# Patient Record
Sex: FEMALE | Race: BLACK OR AFRICAN AMERICAN | Employment: UNEMPLOYED | URBAN - METROPOLITAN AREA
[De-identification: names, ages, dates, MRNs, and addresses within clinical notes are randomized per-mention and may not be internally consistent; named-entity substitution may affect disease eponyms.]

---

## 2022-10-04 NOTE — H&P (VIEW-ONLY)
CC:   Chief Complaint   Patient presents with    Advice Only     Went to er for right upper quadrant pain radiating to middle shoulders nausea and vomiting        Assessment:    ICD-10-CM ICD-9-CM    1. Gallstones  K80.20 574.20 SCHEDULE SURGERY      2. Morbid obesity with BMI of 40.0-44.9, adult ([facility]  E66.01 278.01     Z68.41 V85.41           Plan: I was able to review her records from the most recent emergency room visit confirming gallstone noted in the neck of the gallbladder. She was also found to have elevated LFTs. Today she has some mild discomfort in the right upper quadrant but able to tolerate a diet with no changes to bowel or bladder habits. I did recommend she undergo a laparoscopic cholecystectomy sooner rather than later for ongoing symptoms. She agrees with this plan. The risks and benefits of the procedure were reviewed with the patient including infection, bleeding, need for repeat procedure, injury to surrounding structures, chronic pain, bile leak and bile duct injury. The patient's questions were answered and consent was obtained. HPI:  Claudine Patel is a 24 y.o. female who has been experiencing on and off symptoms of right upper quadrant pain since May 2022. She states symptoms will spontaneously improve on their own. No fevers or chills. She states the abdomen currently feels sore. She was recently seen at [facility] on September 29, 2022 presenting for right-sided abdominal pain that was constant. She states she was given pain medication and nausea medication and symptoms did improve. This time she did undergo ultrasound of the right upper quadrant demonstrating a nonmobile stone in the neck of the gallbladder. Reports no changes to bowel or bladder habits. Pain will occasionally radiate into the back between the shoulder blades. She reports no changes to her weight or new medications. Allergies:   Allergies   Allergen Reactions    Lactose Diarrhea Medication Review:  No current outpatient medications on file prior to visit. No current facility-administered medications on file prior to visit. Systems Review:  Review of Systems   Constitutional:  Negative for fatigue, fever and unexpected weight change. Respiratory:  Negative for chest tightness and shortness of breath. Cardiovascular:  Negative for chest pain and palpitations. Gastrointestinal:  Positive for abdominal pain and nausea. Negative for anal bleeding, blood in stool, constipation, rectal pain and vomiting. Skin:  Negative for pallor, rash and wound. PMH:  Past Medical History:   Diagnosis Date    ADD (attention deficit disorder)     Aggressive outburst     Anxiety state, unspecified 04/30/2013    Asthma     Depression     bipolar    Homicide attempt     [facility])     Self mutilation     Suicidal thoughts     Trauma     Unspecified episodic mood disorder 04/30/2013       Surgical History:  Past Surgical History:   Procedure Laterality Date    HX HEENT      tonsillectomy    HX OTHER SURGICAL      growth removed from mouth age 3       Social History:  Social History     Socioeconomic History    Marital status: SINGLE   Tobacco Use    Smoking status: Never    Smokeless tobacco: Never   Substance and Sexual Activity    Alcohol use: No    Drug use: No    Sexual activity: Never       Family History:  Family History   Problem Relation Age of Onset    Schizophrenia Other        No visits with results within 3 Month(s) from this visit. Latest known visit with results is:   Admission on 03/11/2014, Discharged on 03/11/2014   Component Date Value Ref Range Status    HCG urine, QL 03/11/2014 NEGATIVE  NEGATIVE Final    Comment: Test results should be confirmed using serum quantitative hCG when detection                            of pregnancy is critical and before performing any critical medical procedure.     BENZODIAZEPINES 03/11/2014 NEGATIVE  NEGATIVE Final    BARBITURATES 03/11/2014 NEGATIVE  NEGATIVE Final    THC (TH-CANNABINOL) 03/11/2014 NEGATIVE  NEGATIVE Final    OPIATES 03/11/2014 NEGATIVE  NEGATIVE Final    PCP(PHENCYCLIDINE) 03/11/2014 NEGATIVE  NEGATIVE Final    COCAINE 03/11/2014 NEGATIVE  NEGATIVE Final    AMPHETAMINES 03/11/2014 NEGATIVE  NEGATIVE Final    METHADONE 03/11/2014 NEGATIVE  NEGATIVE Final    HDSCOM 03/11/2014 (NOTE)   Final    Comment: Specimen analysis was performed without chain of custody handling. These results should be used for medical purposes only and not for                            legal or employment purposes. Unconfirmed screening results must not                            be used for non-medical purposes. The cut-off concentration for positive results are as follows:                           AMPH     1000 ng/mL                           NIKOLAY      200 ng/mL                           TEE      200 ng/mL                           YELENA       300 ng/mL                           METH      300 ng/mL                           OPI       300 ng/mL                           PCP        25 ng/mL                           THC        50 ng/mL       No image results found. Physical Exam:  Visit Vitals  /70   Pulse 74   Temp 98 °F (36.7 °C)   Resp 18   Ht 5' 6\" (1.676 m)   Wt 122.5 kg (270 lb)   SpO2 95%   BMI 43.58 kg/m²    BMI: Body mass index is 43.58 kg/m². Physical Exam  Constitutional:       Appearance: Normal appearance. She is obese. Eyes:      Extraocular Movements: Extraocular movements intact. Conjunctiva/sclera: Conjunctivae normal.      Pupils: Pupils are equal, round, and reactive to light. Cardiovascular:      Rate and Rhythm: Normal rate. Pulmonary:      Effort: Pulmonary effort is normal.   Abdominal:      General: Abdomen is flat. There is no distension. Palpations: Abdomen is soft. There is no mass. Tenderness: There is no abdominal tenderness.  There is no guarding or rebound. Hernia: No hernia is present. Skin:     General: Skin is warm and dry. Neurological:      General: No focal deficit present. Mental Status: She is alert and oriented to person, place, and time. Psychiatric:         Mood and Affect: Mood normal.         Behavior: Behavior normal.         Thought Content: Thought content normal.         Judgment: Judgment normal.       I have reviewed the information entered by the clinical staff and/or patient and verified it as accurate or edited where necessary.      Electronically signed by:    Luisa Aponte DO, MPH

## 2022-10-04 NOTE — PROGRESS NOTES
CC:   Chief Complaint   Patient presents with    Advice Only     Went to er for right upper quadrant pain radiating to middle shoulders nausea and vomiting        Assessment:    ICD-10-CM ICD-9-CM    1. Gallstones  K80.20 574.20 SCHEDULE SURGERY      2. Morbid obesity with BMI of 40.0-44.9, adult ([facility]  E66.01 278.01     Z68.41 V85.41           Plan: I was able to review her records from the most recent emergency room visit confirming gallstone noted in the neck of the gallbladder. She was also found to have elevated LFTs. Today she has some mild discomfort in the right upper quadrant but able to tolerate a diet with no changes to bowel or bladder habits. I did recommend she undergo a laparoscopic cholecystectomy sooner rather than later for ongoing symptoms. She agrees with this plan. The risks and benefits of the procedure were reviewed with the patient including infection, bleeding, need for repeat procedure, injury to surrounding structures, chronic pain, bile leak and bile duct injury. The patient's questions were answered and consent was obtained. HPI:  Dali Rutledge is a 24 y.o. female who has been experiencing on and off symptoms of right upper quadrant pain since May 2022. She states symptoms will spontaneously improve on their own. No fevers or chills. She states the abdomen currently feels sore. She was recently seen at [facility] on September 29, 2022 presenting for right-sided abdominal pain that was constant. She states she was given pain medication and nausea medication and symptoms did improve. This time she did undergo ultrasound of the right upper quadrant demonstrating a nonmobile stone in the neck of the gallbladder. Reports no changes to bowel or bladder habits. Pain will occasionally radiate into the back between the shoulder blades. She reports no changes to her weight or new medications. Allergies:   Allergies   Allergen Reactions    Lactose Diarrhea Medication Review:  No current outpatient medications on file prior to visit. No current facility-administered medications on file prior to visit. Systems Review:  Review of Systems   Constitutional:  Negative for fatigue, fever and unexpected weight change. Respiratory:  Negative for chest tightness and shortness of breath. Cardiovascular:  Negative for chest pain and palpitations. Gastrointestinal:  Positive for abdominal pain and nausea. Negative for anal bleeding, blood in stool, constipation, rectal pain and vomiting. Skin:  Negative for pallor, rash and wound. PMH:  Past Medical History:   Diagnosis Date    ADD (attention deficit disorder)     Aggressive outburst     Anxiety state, unspecified 04/30/2013    Asthma     Depression     bipolar    Homicide attempt     [facility])     Self mutilation     Suicidal thoughts     Trauma     Unspecified episodic mood disorder 04/30/2013       Surgical History:  Past Surgical History:   Procedure Laterality Date    HX HEENT      tonsillectomy    HX OTHER SURGICAL      growth removed from mouth age 3       Social History:  Social History     Socioeconomic History    Marital status: SINGLE   Tobacco Use    Smoking status: Never    Smokeless tobacco: Never   Substance and Sexual Activity    Alcohol use: No    Drug use: No    Sexual activity: Never       Family History:  Family History   Problem Relation Age of Onset    Schizophrenia Other        No visits with results within 3 Month(s) from this visit. Latest known visit with results is:   Admission on 03/11/2014, Discharged on 03/11/2014   Component Date Value Ref Range Status    HCG urine, QL 03/11/2014 NEGATIVE  NEGATIVE Final    Comment: Test results should be confirmed using serum quantitative hCG when detection                            of pregnancy is critical and before performing any critical medical procedure.     BENZODIAZEPINES 03/11/2014 NEGATIVE  NEGATIVE Final    BARBITURATES 03/11/2014 NEGATIVE  NEGATIVE Final    THC (TH-CANNABINOL) 03/11/2014 NEGATIVE  NEGATIVE Final    OPIATES 03/11/2014 NEGATIVE  NEGATIVE Final    PCP(PHENCYCLIDINE) 03/11/2014 NEGATIVE  NEGATIVE Final    COCAINE 03/11/2014 NEGATIVE  NEGATIVE Final    AMPHETAMINES 03/11/2014 NEGATIVE  NEGATIVE Final    METHADONE 03/11/2014 NEGATIVE  NEGATIVE Final    HDSCOM 03/11/2014 (NOTE)   Final    Comment: Specimen analysis was performed without chain of custody handling. These results should be used for medical purposes only and not for                            legal or employment purposes. Unconfirmed screening results must not                            be used for non-medical purposes. The cut-off concentration for positive results are as follows:                           AMPH     1000 ng/mL                           NIKOLAY      200 ng/mL                           TEE      200 ng/mL                           YELENA       300 ng/mL                           METH      300 ng/mL                           OPI       300 ng/mL                           PCP        25 ng/mL                           THC        50 ng/mL       No image results found. Physical Exam:  Visit Vitals  /70   Pulse 74   Temp 98 °F (36.7 °C)   Resp 18   Ht 5' 6\" (1.676 m)   Wt 122.5 kg (270 lb)   SpO2 95%   BMI 43.58 kg/m²    BMI: Body mass index is 43.58 kg/m². Physical Exam  Constitutional:       Appearance: Normal appearance. She is obese. Eyes:      Extraocular Movements: Extraocular movements intact. Conjunctiva/sclera: Conjunctivae normal.      Pupils: Pupils are equal, round, and reactive to light. Cardiovascular:      Rate and Rhythm: Normal rate. Pulmonary:      Effort: Pulmonary effort is normal.   Abdominal:      General: Abdomen is flat. There is no distension. Palpations: Abdomen is soft. There is no mass. Tenderness: There is no abdominal tenderness.  There is no guarding or rebound. Hernia: No hernia is present. Skin:     General: Skin is warm and dry. Neurological:      General: No focal deficit present. Mental Status: She is alert and oriented to person, place, and time. Psychiatric:         Mood and Affect: Mood normal.         Behavior: Behavior normal.         Thought Content: Thought content normal.         Judgment: Judgment normal.       I have reviewed the information entered by the clinical staff and/or patient and verified it as accurate or edited where necessary.      Electronically signed by:    Carole Harley DO, MPH

## 2022-10-05 ENCOUNTER — OFFICE VISIT (OUTPATIENT)
Dept: SURGERY | Age: 22
End: 2022-10-05
Payer: COMMERCIAL

## 2022-10-05 VITALS
RESPIRATION RATE: 18 BRPM | OXYGEN SATURATION: 95 % | BODY MASS INDEX: 43.39 KG/M2 | DIASTOLIC BLOOD PRESSURE: 70 MMHG | HEIGHT: 66 IN | WEIGHT: 270 LBS | SYSTOLIC BLOOD PRESSURE: 115 MMHG | HEART RATE: 74 BPM | TEMPERATURE: 98 F

## 2022-10-05 DIAGNOSIS — E66.01 MORBID OBESITY WITH BMI OF 40.0-44.9, ADULT (HCC): ICD-10-CM

## 2022-10-05 DIAGNOSIS — K80.20 GALLSTONES: Primary | ICD-10-CM

## 2022-10-05 PROCEDURE — 99204 OFFICE O/P NEW MOD 45 MIN: CPT | Performed by: SURGERY

## 2022-10-10 ENCOUNTER — TELEPHONE (OUTPATIENT)
Dept: SURGERY | Age: 22
End: 2022-10-10

## 2022-10-10 NOTE — TELEPHONE ENCOUNTER
Patients mother called requesting pain medication via voicemail to nurse line. I returned call and LVM advising to follow up with PCP for medication request  prior to surgery.

## 2022-10-13 ENCOUNTER — ANESTHESIA EVENT (OUTPATIENT)
Dept: SURGERY | Age: 22
End: 2022-10-13
Payer: COMMERCIAL

## 2022-10-14 ENCOUNTER — HOSPITAL ENCOUNTER (OUTPATIENT)
Age: 22
Setting detail: OUTPATIENT SURGERY
Discharge: HOME | End: 2022-10-14
Attending: SURGERY | Admitting: SURGERY
Payer: COMMERCIAL

## 2022-10-14 ENCOUNTER — ANESTHESIA (OUTPATIENT)
Dept: SURGERY | Age: 22
End: 2022-10-14
Payer: COMMERCIAL

## 2022-10-14 VITALS
TEMPERATURE: 97 F | HEIGHT: 67 IN | WEIGHT: 270 LBS | DIASTOLIC BLOOD PRESSURE: 83 MMHG | SYSTOLIC BLOOD PRESSURE: 130 MMHG | HEART RATE: 80 BPM | RESPIRATION RATE: 16 BRPM | BODY MASS INDEX: 42.38 KG/M2 | OXYGEN SATURATION: 100 %

## 2022-10-14 DIAGNOSIS — Z90.49 S/P LAPAROSCOPIC CHOLECYSTECTOMY: Primary | ICD-10-CM

## 2022-10-14 LAB — HCG UR QL: NEGATIVE

## 2022-10-14 PROCEDURE — 74011250636 HC RX REV CODE- 250/636: Performed by: NURSE ANESTHETIST, CERTIFIED REGISTERED

## 2022-10-14 PROCEDURE — 77030012770 HC TRCR OPT FX AMR -B: Performed by: SURGERY

## 2022-10-14 PROCEDURE — 77030031139 HC SUT VCRL2 J&J -A: Performed by: SURGERY

## 2022-10-14 PROCEDURE — 76060000032 HC ANESTHESIA 0.5 TO 1 HR: Performed by: SURGERY

## 2022-10-14 PROCEDURE — 76210000023 HC REC RM PH II 2 TO 2.5 HR: Performed by: SURGERY

## 2022-10-14 PROCEDURE — 74011000250 HC RX REV CODE- 250: Performed by: NURSE ANESTHETIST, CERTIFIED REGISTERED

## 2022-10-14 PROCEDURE — 77030040361 HC SLV COMPR DVT MDII -B: Performed by: SURGERY

## 2022-10-14 PROCEDURE — 77030040922 HC BLNKT HYPOTHRM STRY -A: Performed by: SURGERY

## 2022-10-14 PROCEDURE — 77030035029 HC NDL INSUF VERES DISP COVD -B: Performed by: SURGERY

## 2022-10-14 PROCEDURE — 47562 LAPAROSCOPIC CHOLECYSTECTOMY: CPT | Performed by: SURGERY

## 2022-10-14 PROCEDURE — 77030008608 HC TRCR ENDOSC SMTH AMR -B: Performed by: SURGERY

## 2022-10-14 PROCEDURE — 77030008756 HC TU IRR SUC STRY -B: Performed by: SURGERY

## 2022-10-14 PROCEDURE — 77030020053 HC ELECTRD LAPSCP COVD -B: Performed by: SURGERY

## 2022-10-14 PROCEDURE — 77030020829: Performed by: SURGERY

## 2022-10-14 PROCEDURE — 74011000250 HC RX REV CODE- 250: Performed by: SURGERY

## 2022-10-14 PROCEDURE — 00790 ANES IPER UPR ABD NOS: CPT | Performed by: NURSE ANESTHETIST, CERTIFIED REGISTERED

## 2022-10-14 PROCEDURE — 77030010507 HC ADH SKN DERMBND J&J -B: Performed by: SURGERY

## 2022-10-14 PROCEDURE — 77030018875 HC APPL CLP LIG4 J&J -B: Performed by: SURGERY

## 2022-10-14 PROCEDURE — 77030010031 HC SCIS ENDOSC MPLR J&J -C: Performed by: SURGERY

## 2022-10-14 PROCEDURE — 00790 ANES IPER UPR ABD NOS: CPT | Performed by: STUDENT IN AN ORGANIZED HEALTH CARE EDUCATION/TRAINING PROGRAM

## 2022-10-14 PROCEDURE — 76210000016 HC OR PH I REC 1 TO 1.5 HR: Performed by: SURGERY

## 2022-10-14 PROCEDURE — 81025 URINE PREGNANCY TEST: CPT

## 2022-10-14 PROCEDURE — 77030002933 HC SUT MCRYL J&J -A: Performed by: SURGERY

## 2022-10-14 PROCEDURE — 88304 TISSUE EXAM BY PATHOLOGIST: CPT

## 2022-10-14 PROCEDURE — 74011250637 HC RX REV CODE- 250/637: Performed by: NURSE ANESTHETIST, CERTIFIED REGISTERED

## 2022-10-14 PROCEDURE — 77030009851 HC PCH RTVR ENDOSC AMR -B: Performed by: SURGERY

## 2022-10-14 PROCEDURE — 2709999900 HC NON-CHARGEABLE SUPPLY: Performed by: SURGERY

## 2022-10-14 PROCEDURE — 74011250637 HC RX REV CODE- 250/637: Performed by: SURGERY

## 2022-10-14 PROCEDURE — 76010000138 HC OR TIME 0.5 TO 1 HR: Performed by: SURGERY

## 2022-10-14 PROCEDURE — 74011250636 HC RX REV CODE- 250/636: Performed by: SURGERY

## 2022-10-14 RX ORDER — DEXTROSE MONOHYDRATE 100 MG/ML
0-250 INJECTION, SOLUTION INTRAVENOUS AS NEEDED
Status: DISCONTINUED | OUTPATIENT
Start: 2022-10-14 | End: 2022-10-14 | Stop reason: HOSPADM

## 2022-10-14 RX ORDER — MIDAZOLAM HYDROCHLORIDE 1 MG/ML
INJECTION, SOLUTION INTRAMUSCULAR; INTRAVENOUS AS NEEDED
Status: DISCONTINUED | OUTPATIENT
Start: 2022-10-14 | End: 2022-10-14 | Stop reason: HOSPADM

## 2022-10-14 RX ORDER — SODIUM CHLORIDE, SODIUM LACTATE, POTASSIUM CHLORIDE, CALCIUM CHLORIDE 600; 310; 30; 20 MG/100ML; MG/100ML; MG/100ML; MG/100ML
75 INJECTION, SOLUTION INTRAVENOUS CONTINUOUS
Status: DISCONTINUED | OUTPATIENT
Start: 2022-10-14 | End: 2022-10-14 | Stop reason: HOSPADM

## 2022-10-14 RX ORDER — MAGNESIUM SULFATE 100 %
4 CRYSTALS MISCELLANEOUS AS NEEDED
Status: DISCONTINUED | OUTPATIENT
Start: 2022-10-14 | End: 2022-10-14 | Stop reason: HOSPADM

## 2022-10-14 RX ORDER — FENTANYL CITRATE 50 UG/ML
25 INJECTION, SOLUTION INTRAMUSCULAR; INTRAVENOUS AS NEEDED
Status: COMPLETED | OUTPATIENT
Start: 2022-10-14 | End: 2022-10-14

## 2022-10-14 RX ORDER — LIDOCAINE HYDROCHLORIDE 20 MG/ML
INJECTION, SOLUTION EPIDURAL; INFILTRATION; INTRACAUDAL; PERINEURAL AS NEEDED
Status: DISCONTINUED | OUTPATIENT
Start: 2022-10-14 | End: 2022-10-14 | Stop reason: HOSPADM

## 2022-10-14 RX ORDER — SODIUM CHLORIDE 0.9 % (FLUSH) 0.9 %
5-40 SYRINGE (ML) INJECTION AS NEEDED
Status: DISCONTINUED | OUTPATIENT
Start: 2022-10-14 | End: 2022-10-14 | Stop reason: HOSPADM

## 2022-10-14 RX ORDER — FENTANYL CITRATE 50 UG/ML
INJECTION, SOLUTION INTRAMUSCULAR; INTRAVENOUS AS NEEDED
Status: DISCONTINUED | OUTPATIENT
Start: 2022-10-14 | End: 2022-10-14 | Stop reason: HOSPADM

## 2022-10-14 RX ORDER — PROPOFOL 10 MG/ML
INJECTION, EMULSION INTRAVENOUS AS NEEDED
Status: DISCONTINUED | OUTPATIENT
Start: 2022-10-14 | End: 2022-10-14 | Stop reason: HOSPADM

## 2022-10-14 RX ORDER — FENTANYL CITRATE 50 UG/ML
50 INJECTION, SOLUTION INTRAMUSCULAR; INTRAVENOUS
Status: DISCONTINUED | OUTPATIENT
Start: 2022-10-14 | End: 2022-10-14 | Stop reason: HOSPADM

## 2022-10-14 RX ORDER — FAMOTIDINE 20 MG/1
20 TABLET, FILM COATED ORAL ONCE
Status: COMPLETED | OUTPATIENT
Start: 2022-10-14 | End: 2022-10-14

## 2022-10-14 RX ORDER — ROCURONIUM BROMIDE 10 MG/ML
INJECTION, SOLUTION INTRAVENOUS AS NEEDED
Status: DISCONTINUED | OUTPATIENT
Start: 2022-10-14 | End: 2022-10-14 | Stop reason: HOSPADM

## 2022-10-14 RX ORDER — SODIUM CHLORIDE, SODIUM LACTATE, POTASSIUM CHLORIDE, CALCIUM CHLORIDE 600; 310; 30; 20 MG/100ML; MG/100ML; MG/100ML; MG/100ML
25 INJECTION, SOLUTION INTRAVENOUS CONTINUOUS
Status: DISCONTINUED | OUTPATIENT
Start: 2022-10-14 | End: 2022-10-14 | Stop reason: HOSPADM

## 2022-10-14 RX ORDER — HYDROCODONE BITARTRATE AND ACETAMINOPHEN 5; 325 MG/1; MG/1
1 TABLET ORAL
Qty: 15 TABLET | Refills: 0 | Status: SHIPPED | OUTPATIENT
Start: 2022-10-14 | End: 2022-10-19

## 2022-10-14 RX ORDER — HYDROCODONE BITARTRATE AND ACETAMINOPHEN 5; 325 MG/1; MG/1
1 TABLET ORAL ONCE
Status: COMPLETED | OUTPATIENT
Start: 2022-10-14 | End: 2022-10-14

## 2022-10-14 RX ORDER — DEXAMETHASONE SODIUM PHOSPHATE 4 MG/ML
INJECTION, SOLUTION INTRA-ARTICULAR; INTRALESIONAL; INTRAMUSCULAR; INTRAVENOUS; SOFT TISSUE AS NEEDED
Status: DISCONTINUED | OUTPATIENT
Start: 2022-10-14 | End: 2022-10-14 | Stop reason: HOSPADM

## 2022-10-14 RX ORDER — SODIUM CHLORIDE 0.9 % (FLUSH) 0.9 %
5-40 SYRINGE (ML) INJECTION EVERY 8 HOURS
Status: DISCONTINUED | OUTPATIENT
Start: 2022-10-14 | End: 2022-10-14 | Stop reason: HOSPADM

## 2022-10-14 RX ORDER — OXYCODONE AND ACETAMINOPHEN 5; 325 MG/1; MG/1
1 TABLET ORAL AS NEEDED
Status: DISCONTINUED | OUTPATIENT
Start: 2022-10-14 | End: 2022-10-14 | Stop reason: HOSPADM

## 2022-10-14 RX ORDER — ONDANSETRON 2 MG/ML
INJECTION INTRAMUSCULAR; INTRAVENOUS AS NEEDED
Status: DISCONTINUED | OUTPATIENT
Start: 2022-10-14 | End: 2022-10-14 | Stop reason: HOSPADM

## 2022-10-14 RX ADMIN — MIDAZOLAM HYDROCHLORIDE 2 MG: 2 INJECTION, SOLUTION INTRAMUSCULAR; INTRAVENOUS at 13:45

## 2022-10-14 RX ADMIN — ROCURONIUM BROMIDE 40 MG: 50 INJECTION INTRAVENOUS at 13:56

## 2022-10-14 RX ADMIN — DEXAMETHASONE SODIUM PHOSPHATE 4 MG: 4 INJECTION, SOLUTION INTRAMUSCULAR; INTRAVENOUS at 14:01

## 2022-10-14 RX ADMIN — FENTANYL CITRATE 50 MCG: 50 INJECTION, SOLUTION INTRAMUSCULAR; INTRAVENOUS at 14:48

## 2022-10-14 RX ADMIN — ONDANSETRON 4 MG: 2 INJECTION INTRAMUSCULAR; INTRAVENOUS at 14:01

## 2022-10-14 RX ADMIN — FENTANYL CITRATE 25 MCG: 50 INJECTION, SOLUTION INTRAMUSCULAR; INTRAVENOUS at 15:34

## 2022-10-14 RX ADMIN — SUGAMMADEX 200 MG: 100 INJECTION, SOLUTION INTRAVENOUS at 14:28

## 2022-10-14 RX ADMIN — CEFAZOLIN SODIUM 3 G: 1 INJECTION, POWDER, FOR SOLUTION INTRAMUSCULAR; INTRAVENOUS at 13:59

## 2022-10-14 RX ADMIN — FENTANYL CITRATE 25 MCG: 50 INJECTION, SOLUTION INTRAMUSCULAR; INTRAVENOUS at 15:02

## 2022-10-14 RX ADMIN — PROPOFOL 200 MG: 10 INJECTION, EMULSION INTRAVENOUS at 13:56

## 2022-10-14 RX ADMIN — FAMOTIDINE 20 MG: 20 TABLET ORAL at 12:52

## 2022-10-14 RX ADMIN — HYDROCODONE BITARTRATE AND ACETAMINOPHEN 1 TABLET: 5; 325 TABLET ORAL at 16:16

## 2022-10-14 RX ADMIN — FENTANYL CITRATE 100 MCG: 50 INJECTION, SOLUTION INTRAMUSCULAR; INTRAVENOUS at 13:56

## 2022-10-14 RX ADMIN — SODIUM CHLORIDE, SODIUM LACTATE, POTASSIUM CHLORIDE, AND CALCIUM CHLORIDE 75 ML/HR: 600; 310; 30; 20 INJECTION, SOLUTION INTRAVENOUS at 13:00

## 2022-10-14 RX ADMIN — LIDOCAINE HYDROCHLORIDE 40 MG: 20 INJECTION, SOLUTION EPIDURAL; INFILTRATION; INTRACAUDAL; PERINEURAL at 13:56

## 2022-10-14 NOTE — PERIOP NOTES
Received pt from OR screaming and thrashing around in pain. Pt was connected to the monitor. VSS. Anesthesia report received. RN thelma pt pain medicine   1550- Pt has been continuing to rate pain a 7/10. Pt has been given 3 doses of pain meds. Pt is experiencing gas pain from lap sage. Pt has been instructed to drink ginger ale and to walk in order to decrease pain as IV and PO pain meds will not eliminate the pain on their own.  Pt is refusing the ginger ale

## 2022-10-14 NOTE — OP NOTES
Laparoscopic Cholecystectomy Operative Report    Patient Name: Dustin Gaytan     SURGERY DATE: 10/14/22     : 2000     AGE: 24 y.o. Anesthesiologist: Anesthesiologist: Arely De La Cruz MD  CRNA: Selene Morales CRNA     Surgeon: Melly Estevez DO    Anesthesia: General     Surgical assist: Circ-1: Sergio Hermosillo RN  Surg Asst-1: Robert Jones    PreOp DX: Gallstones [K80.20]     PostOp DX: same    Procedure: Laparoscopic cholecystectomy     Procedure Details: After informed consent was obtained the patient was taken to the operating room and placed in the supine position. General anesthesia was administered by the anesthetist to titrate to effect. The abdomen was prepped and draped in the usual sterile fashion and a timeout procedure was performed. Next using 11 blade scalpel a 5 mm incision was made superior to the umbilicus. A Veress needle was used to establish pneumoperitoneum and a 5 mm trocar was inserted. The laparoscope was inserted. Next under direct visualization 3 additional trochars were placed, one 12 mm in the epigastric region and 2 5 mm along the right costal margin. The gallbladder was then retracted over the liver and out laterally. A [place] dissector was used to dissect out the cystic duct and artery which were clearly visualized entering the gallbladder. Two clips were placed proximally and one distally each on the duct and artery. They were then transected with laparoscopic scissors. Bovie electrocautery was then used to dissect the gallbladder off the liver bed. The gallbladder was then placed in Endo Catch bag and removed from the epigastric trocar site. The  liver bed was inspected and found to be hemostatic. The clips were visualized in good position. Pneumoperitoneum was then discontinued. The trochars were removed under direct visualization and there was no evidence of bleeding.   The fascia of the epigastric trocar site was closed with 0 Vicryl suture in a figure-of-eight fashion. The skin incisions were then closed with 4-0 Monocryl in a subcuticular manner. Dermabond dressing was then applied. The patient was subsequently extubated, tolerated the procedure well and sent to recovery in stable condition.     Implants: * No implants in log *    Estimated Blood Loss:  5cc    Specimens: gallbladder           Complications: None           Disposition: extubated, tolerated procedure well            Condition: Stable    Hermila Morales DO

## 2022-10-14 NOTE — DISCHARGE INSTRUCTIONS
Post Operative Discharge Instructions    No driving for 24 hours after surgery and off of prescription pain medication. Avoid activities that bump or cause jarring movements at the surgical site for 10 days. No lifting more than 10-15 pounds for 6 weeks after surgery or until cleared for activity at your follow up. Walking is encouraged after surgery. Stairs are ok to climb. DIET:    Diet as tolerated. Start with liquids then advance your diet based on how you fell. No alcoholic beverages for 24 hours after surgery or while on antibiotics or pain mdications. Drink plenty of water. MEDICATIONS:    Use daily stool softners (over the counter such as Colace or Senekot) while on pain medications. Resume pre-operative medications. If you are on any blood thinners see special instructions below. Use prescriptions given or Tylenol, Ibuprofen as needed for pain. Do not use more than 4000mg of Tylenol (acetaminophen) per day. Be aware this may be  in your prescription medication as well. Be aware narcotic prescriptions are tightly controlled in the state of [place]. If requiring more than one refill, a follow up appointment will be required. WOUND CARE:      You have skin glue on your incisions, you may shower in 24 hours and pat dry. Glue will fall off on it's own. Do not tub bathe, swim, or soak incisions until cleared to do so at your follow up. Ice bag to the affected area; 20 minutes on and 20 minutes off if desired. FOLLOW UP CARE:    You should have an appointment scheduled within 14 days after surgery. If this is not yet scheduled, call the office. Any forms that you need filled out regarding your medical care can be brought to the office at follow up appointment of faxed to: [id number] [employer] IF:  Temperature is over 101 degrees, a slight fever can be normal 24-48 hour after surgery.   Nausea & vomiting that persists more than 24 hours after surgery. Your wound appears very red, hot, painful or swollen. Excessive bleeding occurs form the incision. *Between the hours 9-5 Monday-Friday please call the office at [phone]. If you do not receive a call back the same day, please do not hesitate to call my cell phone at [phone]    *If there is a medical emergency please go to the nearest emergency room immediately and do not hesitate to call my cell phone    [phone], after hours please call my cell phone        Gallbladder Removal Surgery: What to Expect at [address] [facility]  After your surgery, you will likely feel weak and tired for several days after you return home. Your belly may be swollen. If you had laparoscopic surgery, it's normal to also have some shoulder pain. This is caused by the air that your doctor put in your belly to help see your organs better. You may have gas or need to burp a lot at first. A few people get diarrhea. The diarrhea usually goes away in 2 to 4 weeks, but it may last longer. How quickly you recover depends on whether you had a laparoscopic or open surgery. For a laparoscopic surgery, most people can go back to work or their normal routine in 1 to 2 weeks. But it may take longer, depending on the type of work you do. For an open surgery, it will probably take 4 to 6 weeks before you get back to your normal routine. This care sheet gives you a general idea about how long it will take for you to recover. However, each person recovers at a different pace. Follow the steps below to get better as quickly as possible. How can you care for yourself at home? Activity    Rest when you feel tired. Getting enough sleep will help you recover. Try to walk each day. Start out by walking a little more than you did the day before. Walking helps prevent blood clots in your legs and pneumonia. For about 2 to 4 weeks, avoid lifting anything that would make you strain.  This may include a child, heavy grocery bags and milk containers, a heavy briefcase or backpack, cat litter or dog food bags, or a vacuum . Avoid strenuous activities, such as biking, jogging, weightlifting, and aerobic exercise, until your doctor says it is okay. Ask your doctor when you can drive again. For a laparoscopic surgery, most people can go back to work or their normal routine in 1 to 2 weeks, but it may take longer. For an open surgery, it will probably take 4 to 6 weeks before you get back to your normal routine. Your doctor will tell you when you can have sex again. Diet    When you feel like eating, start with small amounts of food. You may want to avoid fatty foods like fried foods or cheese for a while. They can cause symptoms, such as diarrhea or bloating. Drink plenty of fluids (unless your doctor tells you not to). You may notice that your bowel movements are not regular right after your surgery. This is common. Try to avoid constipation and straining with bowel movements. You may want to take a fiber supplement every day. If you have not had a bowel movement after a couple of days, ask your doctor about taking a mild laxative. Medicines    Your doctor will tell you if and when you can restart your medicines. You will also be given instructions about taking any new medicines. If you take aspirin or some other blood thinner, ask your doctor if and when to start taking it again. Make sure that you understand exactly what your doctor wants you to do. Be safe with medicines. Read and follow all instructions on the label. If the doctor gave you a prescription medicine for pain, take it as prescribed. If you are not taking a prescription pain medicine, ask your doctor if you can take an over-the-counter medicine. Do not take two or more pain medicines at the same time unless the doctor told you to. Many pain medicines contain acetaminophen, which is Tylenol. Too much Tylenol can be harmful. If you think your pain medicine is making you sick to your stomach: Take your medicine after meals (unless your doctor tells you not to). Ask your doctor for a different pain medicine. If your doctor prescribed antibiotics, take them as directed. Do not stop taking them just because you feel better. You need to take the full course of antibiotics. Incision care    If you have strips of tape on the cut (incision) the doctor made, leave the tape on for a week or until it falls off. You may shower 24 to 48 hours after surgery, if your doctor okays it. Pat the incision dry. Do not take a bath for the first 2 weeks, or until your doctor tells you it's okay. You may have staples to hold the cut together. Keep them dry until your doctor takes them out. This is usually in 7 to 10 days. Keep the area clean and dry. You may cover it with a gauze bandage if it oozes fluid or rubs against clothing. Change the bandage every day. Ice    To reduce swelling and pain, put ice or a cold pack on your belly for 10 to 20 minutes at a time. Do this every 1 to 2 hours. Put a thin cloth between the ice and your skin. Follow-up care is a key part of your treatment and safety. Be sure to make and go to all appointments, and call your doctor if you are having problems. It's also a good idea to know your test results and keep a list of the medicines you take. When should you call for help? Call 911 anytime you think you may need emergency care. For example, call if:    You passed out (lost consciousness). You are short of breath. .   Call your doctor now or seek immediate medical care if:    You are sick to your stomach and cannot drink fluids. You have pain that does not get better when you take your pain medicine. You cannot pass stools or gas. You have signs of infection, such as: Increased pain, swelling, warmth, or redness. Red streaks leading from the incision.   Pus draining from the incision. A fever. Bright red blood has soaked through the bandage over your incision. You have loose stitches, or your incision comes open. You have signs of a blood clot in your leg (called a deep vein thrombosis), such as:  Pain in your calf, back of knee, thigh, or groin. Redness and swelling in your leg or groin. Watch closely for any changes in your health, and be sure to contact your doctor if you have any problems. Where can you learn more? Go to http://www.gray.com/  Enter F357 in the search box to learn more about \"Gallbladder Removal Surgery: What to Expect at Home. \"  Current as of: September 8, 2021               Content Version: 13.2  © 2006-2022 [employer]. Care instructions adapted under license by [employer] (which disclaims liability or warranty for this information). If you have questions about a medical condition or this instruction, always ask your healthcare professional. [place] [address] any warranty or liability for your use of this information. Appendectomy: What to Expect at Home  Your Recovery     Your doctor removed your appendix either by making many small cuts, called incisions, in your belly (laparoscopic surgery) or through open surgery. In open surgery, the doctor makes one large incision. The incisions leave scars that usually fade over time. After your surgery, it is normal to feel weak and tired for several days after you return home. Your belly may be swollen and may be painful. If you had laparoscopic surgery, you may have pain in your shoulder for about 24 hours. You may also feel sick to your stomach and have diarrhea, constipation, gas, or a headache. This usually goes away in a few days. Your recovery time depends on the type of surgery you had. If you had laparoscopic surgery, you will probably be able to return to work or a normal routine 1 to 3 weeks after surgery.  If you had an open surgery, it may take 2 to 4 weeks. If your appendix ruptured, you may have a drain in your incision. Your body will work fine without an appendix. You won't have to make any changes in your diet or lifestyle. This care sheet gives you a general idea about how long it will take for you to recover. But each person recovers at a different pace. Follow the steps below to get better as quickly as possible. How can you care for yourself at home? Activity    Rest when you feel tired. Getting enough sleep will help you recover. Try to walk each day. Start by walking a little more than you did the day before. Bit by bit, increase the amount you walk. Walking boosts blood flow and helps prevent pneumonia and constipation. For about 2 weeks, avoid lifting anything that would make you strain. This may include a child, heavy grocery bags and milk containers, a heavy briefcase or backpack, cat litter or dog food bags, or a vacuum . Avoid strenuous activities, such as bicycle riding, jogging, weight lifting, or aerobic exercise, until your doctor says it is okay. You may be able to take showers (unless you have a drain near your incision) 24 to 48 hours after surgery. Pat the incision dry. Do not take a bath for the first 2 weeks, or until your doctor tells you it is okay. If you have a drain near your incision, follow your doctor's instructions. You may drive when you are no longer taking pain medicine and can quickly move your foot from the gas pedal to the brake. You must also be able to sit comfortably for a long period of time, even if you do not plan on going far. You might get caught in traffic. You will probably be able to go back to work in 1 to 3 weeks. If you had an open surgery, it may take 3 to 4 weeks. Your doctor will tell you when you can have sex again. Diet    You can eat your normal diet.  If your stomach is upset, try bland, low-fat foods like plain rice, broiled chicken, toast, and yogurt. Drink plenty of fluids (unless your doctor tells you not to). You may notice that your bowel movements are not regular right after your surgery. This is common. Try to avoid constipation and straining with bowel movements. You may want to take a fiber supplement every day. If you have not had a bowel movement after a couple of days, ask your doctor about taking a mild laxative. Medicines    Your doctor will tell you if and when you can restart your medicines. He or she will also give you instructions about taking any new medicines. If you take aspirin or some other blood thinner, ask your doctor if and when to start taking it again. Make sure that you understand exactly what your doctor wants you to do. If your appendix ruptured, you will need to take antibiotics. Take them as directed. Do not stop taking them just because you feel better. You need to take the full course of antibiotics. Be safe with medicines. Take pain medicines exactly as directed. If the doctor gave you a prescription medicine for pain, take it as prescribed. If you are not taking a prescription pain medicine, take an over-the-counter medicine such as acetaminophen (Tylenol), ibuprofen (Advil, Motrin), or naproxen (Aleve). Read and follow all instructions on the label. Do not take two or more pain medicines at the same time unless the doctor told you to. Many pain medicines have acetaminophen, which is Tylenol. Too much Tylenol can be harmful. If you think your pain medicine is making you sick to your stomach: Take your medicine after meals (unless your doctor has told you not to). Ask your doctor for a different pain medicine. Incision care    If you had an open surgery, you may have staples in your incision. The doctor will take these out in 7 to 10 days. If you have strips of tape on the incision, leave the tape on for a week or until it falls off.      You may wash the area with warm, soapy water 24 to 48 hours after your surgery, unless your doctor tells you not to. Pat the area dry. Keep the area clean and dry. You may cover it with a gauze bandage if it weeps or rubs against clothing. Change the bandage every day. If your appendix ruptured, you may have an incision with packing in it. Change the packing as often as your doctor tells you to. Packing changes may hurt at first. Taking pain medicine about half an hour before you change the dressing can help. If your dressing sticks to your wound, try soaking it with warm water for about 10 minutes before you remove it. You can do this in the shower or by placing a wet washcloth over the dressing. Remove the old packing and flush the incision with water. Gently pat the top area dry. The size of the incision determines how much gauze you need to put inside. Fold the gauze over once, but do not wad it up so that it hurts. Put it in the wound carefully. You want to keep the sides of the wound from touching. A cotton swab may help you push the gauze in as needed. Put a gauze pad over the wound, and tape it down. You may notice greenish gray fluid seeping from your wound as you start to heal. This is normal. It is a sign that your wound is healing. Follow-up care is a key part of your treatment and safety. Be sure to make and go to all appointments, and call your doctor if you are having problems. It's also a good idea to know your test results and keep a list of the medicines you take. When should you call for help? Call 911 anytime you think you may need emergency care. For example, call if:    You passed out (lost consciousness). You are short of breath. .   Call your doctor now or seek immediate medical care if:    You are sick to your stomach and cannot drink fluids. You cannot pass stools or gas. You have pain that does not get better when you take your pain medicine.      You have signs of infection, such as: Increased pain, swelling, warmth, or redness. Red streaks leading from the wound. Pus draining from the wound. A fever. You have loose stitches, or your incision comes open. Bright red blood has soaked through the bandage over your incision. You have signs of a blood clot in your leg (called a deep vein thrombosis), such as:  Pain in your calf, back of knee, thigh, or groin. Redness and swelling in your leg or groin. Watch closely for any changes in your health, and be sure to contact your doctor if you have any problems. Where can you learn more? Go to http://www.gray.com/  Enter X801 in the search box to learn more about \"Appendectomy: What to Expect at Home. \"  Current as of: September 8, 2021               Content Version: 13.2  © 9496-4419 [employer]. Care instructions adapted under license by [employer] (which disclaims liability or warranty for this information). If you have questions about a medical condition or this instruction, always ask your healthcare professional. [place] [address] any warranty or liability for your use of this information. DISCHARGE SUMMARY from Nurse    PATIENT INSTRUCTIONS:    After general anesthesia or intravenous sedation, for 24 hours or while taking prescription Narcotics:  Limit your activities  Do not drive and operate hazardous machinery  Do not make important personal or business decisions  Do  not drink alcoholic beverages  If you have not urinated within 8 hours after discharge, please contact your surgeon on call.     Report the following to your surgeon:  Excessive pain, swelling, redness or odor of or around the surgical area  Temperature over 100.5  Nausea and vomiting lasting longer than 4 hours or if unable to take medications  Any signs of decreased circulation or nerve impairment to extremity: change in color, persistent  numbness, tingling, coldness or increase pain  Any questions    What to do at Home:  Recommended activity: {discharge activity:71954}, ***    If you experience any of the following symptoms ***, please follow up with ***. *  Please give a list of your current medications to your Primary Care Provider. *  Please update this list whenever your medications are discontinued, doses are      changed, or new medications (including over-the-counter products) are added. *  Please carry medication information at all times in case of emergency situations. These are general instructions for a healthy lifestyle:    No smoking/ No tobacco products/ Avoid exposure to second hand smoke  Surgeon General's Warning:  Quitting smoking now greatly reduces serious risk to your health. Obesity, smoking, and sedentary lifestyle greatly increases your risk for illness    A healthy diet, regular physical exercise & weight monitoring are important for maintaining a healthy lifestyle    You may be retaining fluid if you have a history of heart failure or if you experience any of the following symptoms:  Weight gain of 3 pounds or more overnight or 5 pounds in a week, increased swelling in our hands or feet or shortness of breath while lying flat in bed. Please call your doctor as soon as you notice any of these symptoms; do not wait until your next office visit. The discharge information has been reviewed with the {PATIENT PARENT GUARDIAN:72616}. The {PATIENT PARENT GUARDIAN:33341} verbalized understanding. Discharge medications reviewed with the {D[employer]ge meds reviewed DGAP:50176} and appropriate educational materials and side effects teaching were provided.   ___________________________________________________________________________________________________________________________________

## 2022-10-14 NOTE — INTERVAL H&P NOTE
Update History & Physical    The Patient's History and Physical of October 14, 2022 was reviewed with the patient and I examined the patient. There was no change. The surgical site was confirmed by the patient and me. Plan:  The risk, benefits, expected outcome, and alternative to the recommended procedure have been discussed with the patient. Patient understands and wants to proceed with the procedure.     Electronically signed by Luisa Aponte DO on 10/14/2022 at 12:30 PM

## 2022-10-14 NOTE — ANESTHESIA POSTPROCEDURE EVALUATION
Procedure(s):  LAPAROSCOPIC CHOLECYSTECTOMY. general    Anesthesia Post Evaluation      Multimodal analgesia: multimodal analgesia used between 6 hours prior to anesthesia start to PACU discharge  Patient location during evaluation: PACU  Patient participation: complete - patient participated  Level of consciousness: sleepy but conscious  Pain management: adequate  Airway patency: patent  Anesthetic complications: no  Cardiovascular status: acceptable  Respiratory status: acceptable  Hydration status: acceptable  Post anesthesia nausea and vomiting:  controlled  Final Post Anesthesia Temperature Assessment:  Normothermia (36.0-37.5 degrees C)      INITIAL Post-op Vital signs:   Vitals Value Taken Time   /56 10/14/22 1450   Temp 36.1 °C (97 °F) 10/14/22 1441   Pulse 76 10/14/22 1453   Resp 35 10/14/22 1453   SpO2 97 % 10/14/22 1453   Vitals shown include unvalidated device data.

## 2022-10-14 NOTE — ANESTHESIA PREPROCEDURE EVALUATION
Relevant Problems   NEUROLOGY   (+) Unspecified episodic mood disorder       Anesthetic History   No history of anesthetic complications            Review of Systems / Medical History  Patient summary reviewed and pertinent labs reviewed    Pulmonary            Asthma : well controlled       Neuro/Psych         Psychiatric history     Cardiovascular  Within defined limits                Exercise tolerance: >4 METS     GI/Hepatic/Renal           Liver disease     Endo/Other        Morbid obesity     Other Findings              Physical Exam    Airway  Mallampati: II  TM Distance: 4 - 6 cm  Neck ROM: normal range of motion   Mouth opening: Normal     Cardiovascular    Rhythm: regular  Rate: normal         Dental  No notable dental hx       Pulmonary  Breath sounds clear to auscultation               Abdominal  GI exam deferred       Other Findings            Anesthetic Plan    ASA: 2  Anesthesia type: general          Induction: Intravenous  Anesthetic plan and risks discussed with: Patient

## 2022-11-02 ENCOUNTER — OFFICE VISIT (OUTPATIENT)
Dept: SURGERY | Age: 22
End: 2022-11-02
Payer: COMMERCIAL

## 2022-11-02 VITALS
WEIGHT: 269 LBS | HEIGHT: 66 IN | BODY MASS INDEX: 43.23 KG/M2 | SYSTOLIC BLOOD PRESSURE: 114 MMHG | TEMPERATURE: 97.3 F | RESPIRATION RATE: 18 BRPM | HEART RATE: 83 BPM | DIASTOLIC BLOOD PRESSURE: 74 MMHG | OXYGEN SATURATION: 99 %

## 2022-11-02 DIAGNOSIS — Z90.49 S/P LAPAROSCOPIC CHOLECYSTECTOMY: Primary | ICD-10-CM

## 2022-11-02 PROCEDURE — 99024 POSTOP FOLLOW-UP VISIT: CPT | Performed by: SURGERY
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
## 2022-11-02 NOTE — PROGRESS NOTES
Fer Cole is a 24 y.o. female  Chief Complaint   Patient presents with    Post OP Follow Up     10/14/2022 Lap sage      Visit Vitals  /74 (BP 1 Location: Left upper arm, BP Patient Position: Sitting)   Pulse 83   Temp 97.3 °F (36.3 °C) (Temporal)   Resp 18   Ht 5' 6\" (1.676 m)   Wt 269 lb (122 kg)   SpO2 99%   BMI 43.42 kg/m²     1. Have you been to the ER, urgent care clinic since your last visit? Hospitalized since your last visit? No    2. Have you seen or consulted any other health care providers outside of the [address] [place] [address] since your last visit? Include any pap smears or colon screening.  No

## (undated) DEVICE — SUTURE MCRYL SZ 4-0 L27IN ABSRB UD L24MM PS-1 3/8 CIR PRIM Y935H

## (undated) DEVICE — KIT CLN UP BON SECOURS MARYV

## (undated) DEVICE — GLOVE SURG SZ 6 CRM LTX FREE POLYISOPRENE POLYMER BEAD ANTI

## (undated) DEVICE — BLANKET WRM AD W50XL85.8IN PACU FULL BODY FORC AIR

## (undated) DEVICE — APPLIER CLP M L L11.4IN DIA10MM ENDOSCP ROT MULT FOR LIG

## (undated) DEVICE — PREP SKN CHLRAPRP APL 26ML STR --

## (undated) DEVICE — DRAPE TOWEL: Brand: CONVERTORS

## (undated) DEVICE — SCISSORS ENDOSCP DIA5MM CRV MPLR CAUT W/ RATCH HNDL

## (undated) DEVICE — SET SUCT IRR TIP DISP STRYKEFLOW2

## (undated) DEVICE — TROCAR: Brand: KII® SLEEVE

## (undated) DEVICE — SYR 10ML LUER LOK 1/5ML GRAD --

## (undated) DEVICE — Device

## (undated) DEVICE — INSUFFLATION NEEDLE: Brand: SURGINEEDLE

## (undated) DEVICE — REM POLYHESIVE ADULT PATIENT RETURN ELECTRODE: Brand: VALLEYLAB

## (undated) DEVICE — TROCAR: Brand: KII® OPTICAL ACCESS SYSTEM

## (undated) DEVICE — INTENDED FOR TISSUE SEPARATION, AND OTHER PROCEDURES THAT REQUIRE A SHARP SURGICAL BLADE TO PUNCTURE OR CUT.: Brand: BARD-PARKER SAFETY BLADES SIZE 11, STERILE

## (undated) DEVICE — GLOVE SURG SZ 65 L12IN FNGR THK79MIL GRN LTX FREE

## (undated) DEVICE — SUTURE SZ 0 27IN 5/8 CIR UR-6  TAPER PT VIOLET ABSRB VICRYL J603H

## (undated) DEVICE — LAPAROSCOPIC TROCAR SLEEVE/SINGLE USE: Brand: KII® OPTICAL ACCESS SYSTEM

## (undated) DEVICE — GARMENT,MEDLINE,DVT,INT,CALF,MED, GEN2: Brand: MEDLINE

## (undated) DEVICE — TISSUE RETRIEVAL SYSTEM: Brand: INZII RETRIEVAL SYSTEM

## (undated) DEVICE — DERMABOND SKIN ADH 0.7ML -- DERMABOND ADVANCED 12/BX

## (undated) DEVICE — ELECTRODE ES 36CM LAP FLAT L HK COAT DISP CLEANCOAT